# Patient Record
Sex: MALE | Race: WHITE
[De-identification: names, ages, dates, MRNs, and addresses within clinical notes are randomized per-mention and may not be internally consistent; named-entity substitution may affect disease eponyms.]

---

## 2020-07-21 ENCOUNTER — HOSPITAL ENCOUNTER (OUTPATIENT)
Dept: HOSPITAL 46 - DS | Age: 30
Discharge: HOME | End: 2020-07-21
Attending: SURGERY
Payer: COMMERCIAL

## 2020-07-21 VITALS — BODY MASS INDEX: 21.7 KG/M2 | HEIGHT: 71 IN | WEIGHT: 155.01 LBS

## 2020-07-21 DIAGNOSIS — F17.210: ICD-10-CM

## 2020-07-21 DIAGNOSIS — D64.9: ICD-10-CM

## 2020-07-21 DIAGNOSIS — L85.9: ICD-10-CM

## 2020-07-21 DIAGNOSIS — Z79.899: ICD-10-CM

## 2020-07-21 DIAGNOSIS — K21.9: ICD-10-CM

## 2020-07-21 DIAGNOSIS — L30.8: Primary | ICD-10-CM

## 2020-07-21 DIAGNOSIS — L83: ICD-10-CM

## 2020-07-21 PROCEDURE — 0DBP8ZX EXCISION OF RECTUM, VIA NATURAL OR ARTIFICIAL OPENING ENDOSCOPIC, DIAGNOSTIC: ICD-10-PCS | Performed by: SURGERY

## 2020-07-21 PROCEDURE — 0DBH8ZX EXCISION OF CECUM, VIA NATURAL OR ARTIFICIAL OPENING ENDOSCOPIC, DIAGNOSTIC: ICD-10-PCS | Performed by: SURGERY

## 2020-07-21 PROCEDURE — 0DBB8ZX EXCISION OF ILEUM, VIA NATURAL OR ARTIFICIAL OPENING ENDOSCOPIC, DIAGNOSTIC: ICD-10-PCS | Performed by: SURGERY

## 2020-07-21 PROCEDURE — G0500 MOD SEDAT ENDO SERVICE >5YRS: HCPCS

## 2020-07-21 PROCEDURE — 0DBQXZX EXCISION OF ANUS, EXTERNAL APPROACH, DIAGNOSTIC: ICD-10-PCS | Performed by: SURGERY

## 2020-07-21 NOTE — NUR
07/21/20 1127 Lazara Franklin 1111 PT ARRIVED IN PACU SLEEPY WITH NO C/O'S. ABD SOFT. 1125 TAKING
SIPS OF WATER.

## 2020-07-21 NOTE — OR
[facility]
                                    [address], [place]  [address]
_________________________________________________________________________________________
                                                                 Signed   
 
 
DATE OF OPERATION:
07/21/2020
 
SURGEON:
Jus Haynes MD
 
PREOPERATIVE DIAGNOSES:
1. Pruritus ani (improved).
2. History of Blastocystis hominis, stool cultures (treated).
 
POSTOPERATIVE DIAGNOSES:
1. Mild distal proctitis.
2. Normal-appearing ileum and colon otherwise.
 
PROCEDURE:
1. Total colonoscopy to cecum with intubation of ileum and biopsies.
2. Biopsy of anoderm.
 
ANESTHESIA:
Intravenous sedation, fentanyl 150 mcg and Versed 6 mg.
 
INDICATION:
This 30-year-old white man is a patient of [facility] and was seen by me
approximately two years ago with pruritus ani, which was rather significant.  He was
treated with topical steroid and antifungal treatment as well as perianal hygiene
interventions.  He has much improvement in his pruritus ani.  At one point, he was
tested for enteric pathogens and was found to have Blastocystis hominis and was treated
with oral antibiotics. 
 
He recently is seen in evaluation from [facility] regarding his pruritus ani
problem, which is improved, but not completely resolved.  Clinical examination does show
some mild chronic perianal dermal thickening.  Though he does not have a dominant
problem of rectal bleeding or diarrhea, concern is maintained for possible inflammatory
bowel disease.  He is admitted at this time to undergo colonoscopy to assess for
inflammatory bowel disease and other unforeseen findings that may have contributed to
his problem.  He understands the risks of bleeding, infection, and perforation related
to colonoscopy and wished to proceed. 
 
FINDINGS:
The prep was quite good.  Complete colonoscopy was undertaken of the cecum, intubation
of the ileum was accomplished as well.  Biopsies of the ileum were obtained as was the
cecum and rectum.  Additionally, anorectal biopsies in the area of the pruritus ani was
 
    Electronically Signed By: JUS HAYNES MD  07/21/20 1317
_________________________________________________________________________________________
PATIENT NAME:     MELISSA ONOFRE                      
MEDICAL RECORD #: [id number]            OPERATIVE REPORT              
          ACCT #: [id number]  
DATE OF BIRTH:   [birth year]            REPORT #: [id number]      
PHYSICIAN:        JUS HAYNES MD                 
PCP:              [facility]       
REPORT IS CONFIDENTIAL AND NOT TO BE RELEASED WITHOUT AUTHORIZATION
 
 
                                  [facility]
                                    [address], [place]  [address]
_________________________________________________________________________________________
                                                                 Signed   
 
 
undertaken with a Betadine preparation in the perianal skin. 
 
DESCRIPTION OF PROCEDURE:
The patient was brought to the surgical endoscopy suite and placed in lateral decubitus
position, given intravenous sedation to the point of slurred speech and nystagmus with
full cardiopulmonary monitoring.  Digital rectal examination and visual inspection were
undertaken.  There was mild chronic perianal dermatitis suggestive of prior pruritus
ani, but it was not dense and thickened as chronic as often as seen.  The sphincter tone
was normal.  An Olympus video colonoscope was passed in the rectum and manipulated
throughout the colon ultimately intubating the cecum.  The ileocecal valve was easily
intubated and the scope passed into the terminal ileum to be 6 cm.  Biopsies were taken
of the ileum, though it did not have any classic appearance of inflammatory bowel
disease.  The scope was withdrawn to the cecum where biopsies were taken there as well.
Careful withdrawal of scope and examination throughout showed no sign of abnormality
into the lowest part of the rectum where there was mild proctitis.  Biopsies were
obtained.  The scope was removed entirely and the perianal area was prepared with
Betadine solution.  Mindful of his good sedation and so forth 3 separate biopsies were
obtained with biopsy forceps of the [facility].  This was well tolerated despite not using
local anesthetic.  There was minimal bleeding.  The patient was then returned to the
recovery room.  He tolerated procedure well. 
 
CONCLUSION DIAGNOSIS:
1. Mild distal proctitis, unlikely of significance.  No evidence of Crohn disease
otherwise. 
2. Pruritus ani, improved.
 
PLAN:
He will return to see us in 3-4 weeks.  We will look forward to his pathology reports to
better guide therapy as appropriate. 
 
 
 
            ________________________________________
            MD IVÁN Diehl/EMILIANA
Job #:  [id number]
DD:  07/21/2020 11:26:26
DT:  07/21/2020 12:27:54
 
cc:            [facility]
 
    Electronically Signed By: JUS HAYNES MD  07/21/20 1317
_________________________________________________________________________________________
PATIENT NAME:     MELISSA ONOFRE                      
MEDICAL RECORD #: [id number]            OPERATIVE REPORT              
          ACCT #: [id number]  
DATE OF BIRTH:   [birth year]            REPORT #: [id number]      
PHYSICIAN:        JUS HAYNES MD                 
PCP:              [facility]       
REPORT IS CONFIDENTIAL AND NOT TO BE RELEASED WITHOUT AUTHORIZATION
 
 
                                  [address]  [address]
_________________________________________________________________________________________
                                                                 Signed   
 
 
Copies:                                
~
 
 
 
 
 
 
 
 
 
 
 
 
 
 
 
 
 
 
 
 
 
 
 
 
 
 
 
 
 
 
 
 
 
 
 
 
 
 
 
 
 
    Electronically Signed By: JUS HAYNES MD  07/21/20 1317
_________________________________________________________________________________________
PATIENT NAME:     MELISSA ONOFRE                      
MEDICAL RECORD #: [id number]            OPERATIVE REPORT              
          ACCT #: [id number]  
DATE OF BIRTH:   [birth year]            REPORT #: [id number]      
PHYSICIAN:        JUS HAYNES MD                 
PCP:              [facility]       
REPORT IS CONFIDENTIAL AND NOT TO BE RELEASED WITHOUT AUTHORIZATION

## 2020-07-23 NOTE — PATH
[facility]
                                    [address] [facility]
                                  [place], [place]  [address]
_________________________________________________________________________________________
                                                                 Signed   
 
 
 
SPECIMEN(S): A ILEUM
SPECIMEN(S): B CECUM
SPECIMEN(S): C RECTUM
SPECIMEN(S): D ANUS, EXTERNAL
 
SPECIMEN SOURCE:
A. ILEUM
B. CECUM
C. RECTUM
D. ANUS, EXTERNAL
 
CLINICAL HISTORY:
Colonoscopy.  Constipation, pruritis ani, disorder of perianal skin.  Post: 
Mild proctitis, pruritis ani. 
MICROSCOPIC DESCRIPTION:
Histologic sections of all submitted blocks are examined by light microscopy. 
These findings, together with the gross examination, support the pathologic 
diagnosis. 
 
FINAL PATHOLOGIC DIAGNOSIS:
A.  Ileum, biopsy:
-  Ileal mucosa with no histopathologic abnormality.
-  Negative for granulomata.
-  Negative for dysplasia or malignancy.
B.  Colon, cecum, biopsy:
-  Colonic mucosa with no histopathologic abnormality.
-  Negative for active, chronic, microscopic colitis.
-  Negative for dysplasia or malignancy.
C.  Rectum, biopsy:
-  Rectal mucosa with no histopathologic abnormality.
-  Negative for active, chronic, or microscopic proctitis.
-  Negative for dysplasia or malignancy.
D.  Anus, external, biopsy:
-  Spongiotic dermatitis with irregular acanthosis and hyperkeratosis.
-  See Comment.
 
COMMENT:
Regarding specimen D: A PAS/D stain (with appropriately staining controls) is 
negative for fungal organisms. The differential diagnosis includes lichen 
simplex chronicus or a chronic eczematous 
dermatitis such as irritant contact dermatitis. No dysplasia or malignancy is 
 
                                                                                    
_________________________________________________________________________________________
PATIENT NAME:     ANA LAURAJUS ENGLEMELISSA M                      
MEDICAL RECORD #: [id number]            PATHOLOGY                     
          ACCT #: [id number]       ACCESSION #: [id number]     
DATE OF BIRTH:   [birth year]            REPORT #: [id number]       
PHYSICIAN:        [facility]              
PCP:              [facility]       
REPORT IS CONFIDENTIAL AND NOT TO BE RELEASED WITHOUT AUTHORIZATION
 
 
                                  [facility]
                                    [address] [place], [place]  [address]
_________________________________________________________________________________________
                                                                 Signed   
 
 
seen. 
As part of [facility]' Quality Improvement Program, part D of this case 
was reviewed by another member of our pathology staff. 
NAL:cml:C2NR
 
GROSS DESCRIPTION:
Four specimens are received in four containers, labeled "JL."
A. The specimen, labeled "JL," and designated on the requisition "ileum 
biopsy," is received in formalin and consists of three fragments of pink-tan 
tissue (0.7 x 0.5 x 0.3 cm aggregate).  The 
specimen is submitted entirely in cassette (A1).
B. The specimen, labeled "JL," and designated on the requisition "cecum 
biopsy," is received in formalin and consists of one fragment of pink-tan 
tissue (0.3 x 0.2 x 0.1 cm).  The specimen is 
submitted entirely in cassette (B1).
C.  The specimen, labeled "JL," and designated on the requisition "rectum 
biopsy," is received in formalin and consists of three fragments of pink-tan 
tissue (0.4 x 0.3 x 0.2 cm in aggregate).  The 
specimen is submitted entirely in cassette (C1).
 
D.  The specimen, labeled "JL," and designated on the requisition "external 
anus biopsy," is received in formalin and consists of five fragments of 
white-tan to brown tissue (0.6 x 0.4 x 0.1 cm in 
aggregate).  The specimen is submitted entirely in cassette (D1).
AC (under the direct supervision of a pathologist)
The Gross Description was prepared using a voice recognition system.  The 
report was reviewed for accuracy; however, sound-alike word errors, addition 
and/or deletions may occur.  If there is any 
question about this report, please contact Client Services.
 
PERFORMING LABORATORY:
The technical component was performed by [facility], [address] [place] [id number] (Medical Director: Lin Spain MD; CLIA# [id number]). 
Professional interpretation was performed by 
[facility]"[employer] [facility], [address] [address] (CLIA# [id number]). 
 
Diagnostician:  Rachel Bolaños MD
Pathologist
Electronically Signed 07/23/2020
 
 
 
                                                                                    
_________________________________________________________________________________________
PATIENT NAME:     MELISSA ONOFRE                      
MEDICAL RECORD #: [id number]            PATHOLOGY                     
          ACCT #: [id number]       ACCESSION #: [id number]     
DATE OF BIRTH:   [birth year]            REPORT #: [id number]       
PHYSICIAN:        [facility]              
PCP:              [facility]       
REPORT IS CONFIDENTIAL AND NOT TO BE RELEASED WITHOUT AUTHORIZATION
 
 
                                  [facility]
                                    [address] [facility]
                                  [place], [place]  [address]
_________________________________________________________________________________________
                                                                 Signed   
 
 
Copies:                                
~
 
 
 
 
 
 
 
 
 
 
 
 
 
 
 
 
 
 
 
 
 
 
 
 
 
 
 
 
 
 
 
 
 
 
 
 
 
 
 
 
 
                                                                                    
_________________________________________________________________________________________
PATIENT NAME:     MELISSA ONOFRE                      
MEDICAL RECORD #: [id number]            PATHOLOGY                     
          ACCT #: [id number]       ACCESSION #: [id number]     
DATE OF BIRTH:   [birth year]            REPORT #: [id number]       
PHYSICIAN:        KANG TORRES              
PCP:              [facility]       
REPORT IS CONFIDENTIAL AND NOT TO BE RELEASED WITHOUT AUTHORIZATION